# Patient Record
Sex: FEMALE | Race: WHITE | NOT HISPANIC OR LATINO | Employment: UNEMPLOYED | URBAN - METROPOLITAN AREA
[De-identification: names, ages, dates, MRNs, and addresses within clinical notes are randomized per-mention and may not be internally consistent; named-entity substitution may affect disease eponyms.]

---

## 2020-07-24 PROBLEM — F41.9 ANXIETY DISORDER: Status: ACTIVE | Noted: 2018-05-10

## 2020-07-24 PROBLEM — F31.9 BIPOLAR DISORDER: Status: ACTIVE | Noted: 2018-05-10

## 2020-07-24 PROBLEM — F32.A DEPRESSIVE DISORDER: Status: ACTIVE | Noted: 2018-05-10

## 2020-07-24 PROBLEM — F43.10 POSTTRAUMATIC STRESS DISORDER: Status: ACTIVE | Noted: 2018-05-10

## 2020-07-24 PROBLEM — F22 PARANOID DISORDER: Status: ACTIVE | Noted: 2018-05-10

## 2021-06-13 PROBLEM — F31.60 BIPOLAR 1 DISORDER, MIXED: Status: ACTIVE | Noted: 2021-06-13

## 2021-06-14 PROBLEM — F51.5 NIGHTMARE DISORDER: Status: ACTIVE | Noted: 2018-05-10

## 2021-06-14 PROBLEM — F12.20 CANNABIS USE DISORDER, MODERATE, DEPENDENCE: Status: ACTIVE | Noted: 2021-06-14

## 2021-06-14 PROBLEM — F17.200 NICOTINE ADDICTION: Status: ACTIVE | Noted: 2021-06-14

## 2021-06-14 PROBLEM — F60.3 BORDERLINE PERSONALITY DISORDER: Status: ACTIVE | Noted: 2021-06-14

## 2021-12-10 PROBLEM — F90.9 ADHD: Status: ACTIVE | Noted: 2021-12-10

## 2022-08-31 PROBLEM — F31.32 BIPOLAR AFFECTIVE DISORDER, CURRENTLY DEPRESSED, MODERATE: Status: ACTIVE | Noted: 2018-05-10

## 2022-09-20 PROBLEM — F31.60 BIPOLAR 1 DISORDER, MIXED: Status: RESOLVED | Noted: 2021-06-13 | Resolved: 2022-09-20

## 2022-09-20 PROBLEM — F17.200 NICOTINE ADDICTION: Status: RESOLVED | Noted: 2021-06-14 | Resolved: 2022-09-20

## 2022-09-20 PROBLEM — F32.A DEPRESSIVE DISORDER: Status: RESOLVED | Noted: 2018-05-10 | Resolved: 2022-09-20

## 2022-09-20 PROBLEM — F22 PARANOID DISORDER: Status: RESOLVED | Noted: 2018-05-10 | Resolved: 2022-09-20

## 2022-09-20 PROBLEM — F51.5 NIGHTMARE DISORDER: Status: RESOLVED | Noted: 2018-05-10 | Resolved: 2022-09-20

## 2023-04-24 PROBLEM — F16.10 LYSERGIC ACID DIETHYLAMIDE (LSD) ABUSE: Status: ACTIVE | Noted: 2023-04-24

## 2023-04-24 PROBLEM — Z71.6 TOBACCO ABUSE COUNSELING: Status: ACTIVE | Noted: 2023-04-24

## 2023-04-24 PROBLEM — F31.4 BIPOLAR I DISORDER WITH DEPRESSION, SEVERE: Status: ACTIVE | Noted: 2018-05-10

## 2023-08-14 ENCOUNTER — SOCIAL WORK (OUTPATIENT)
Dept: ADMINISTRATIVE | Facility: OTHER | Age: 28
End: 2023-08-14

## 2023-08-14 NOTE — PROGRESS NOTES
Sw received a consult to assist with counseling. Sw called Patient ([phone]). No one answered and voice mail was not set up. Ti will try calling at another time.    Mellisa Escobedo LCSW    [phone]

## 2023-08-15 ENCOUNTER — SOCIAL WORK (OUTPATIENT)
Dept: ADMINISTRATIVE | Facility: OTHER | Age: 28
End: 2023-08-15

## 2023-08-15 NOTE — PROGRESS NOTES
Sw received a consult to assist with counseling. Sw called Patient ([phone]). No one answered and voice mail was not set up. Ti will try calling at another time.    Mellisa Escobedo LCSW    [phone]

## 2023-08-16 ENCOUNTER — SOCIAL WORK (OUTPATIENT)
Dept: ADMINISTRATIVE | Facility: OTHER | Age: 28
End: 2023-08-16